# Patient Record
Sex: FEMALE
[De-identification: names, ages, dates, MRNs, and addresses within clinical notes are randomized per-mention and may not be internally consistent; named-entity substitution may affect disease eponyms.]

---

## 2017-10-22 ENCOUNTER — HEALTH MAINTENANCE LETTER (OUTPATIENT)
Age: 32
End: 2017-10-22

## 2018-02-01 ENCOUNTER — HOSPITAL ENCOUNTER (EMERGENCY)
Facility: HOSPITAL | Age: 33
Discharge: HOME | End: 2018-02-01
Attending: STUDENT IN AN ORGANIZED HEALTH CARE EDUCATION/TRAINING PROGRAM | Admitting: STUDENT IN AN ORGANIZED HEALTH CARE EDUCATION/TRAINING PROGRAM

## 2018-02-01 VITALS
WEIGHT: 145 LBS | BODY MASS INDEX: 24.16 KG/M2 | HEIGHT: 65 IN | DIASTOLIC BLOOD PRESSURE: 89 MMHG | SYSTOLIC BLOOD PRESSURE: 128 MMHG | HEART RATE: 104 BPM | OXYGEN SATURATION: 100 % | TEMPERATURE: 98.2 F | RESPIRATION RATE: 18 BRPM

## 2018-02-01 DIAGNOSIS — J20.9 ACUTE BRONCHITIS, UNSPECIFIED ORGANISM: Primary | ICD-10-CM

## 2018-02-01 DIAGNOSIS — R05.9 COUGH: ICD-10-CM

## 2018-02-01 PROCEDURE — 99282 EMERGENCY DEPT VISIT SF MDM: CPT

## 2018-02-01 RX ORDER — BENZONATATE 200 MG/1
200 CAPSULE ORAL 3 TIMES DAILY PRN
Qty: 20 CAPSULE | Refills: 0 | Status: SHIPPED | OUTPATIENT
Start: 2018-02-01

## 2018-02-01 RX ORDER — SULFAMETHOXAZOLE AND TRIMETHOPRIM 800; 160 MG/1; MG/1
1 TABLET ORAL 2 TIMES DAILY
Qty: 20 TABLET | Refills: 0 | Status: SHIPPED | OUTPATIENT
Start: 2018-02-01

## 2018-02-01 NOTE — DISCHARGE INSTRUCTIONS

## 2018-02-01 NOTE — ED PROVIDER NOTES
Subjective   HPI Comments: Patient's 32-year-old female presents with 2 day history of progressively worsening cough.  Patient denies fever or sweats, but does endorse chills.  She is tobacco cigarette smoker.  She does have a cough and feels like her chest hurts whenever she coughs or takes a deep breath at this time.      Review of Systems    Past Medical History:   Diagnosis Date   • Seizures        No Known Allergies    Past Surgical History:   Procedure Laterality Date   • APPENDECTOMY         History reviewed. No pertinent family history.    Social History     Social History   • Marital status: Single     Spouse name: N/A   • Number of children: N/A   • Years of education: N/A     Social History Main Topics   • Smoking status: Current Every Day Smoker     Packs/day: 0.25     Types: Cigarettes   • Smokeless tobacco: None   • Alcohol use Yes      Comment: wine on weekends   • Drug use: No   • Sexual activity: Defer     Other Topics Concern   • None     Social History Narrative   • None           Objective   Physical Exam   Nursing note and vitals reviewed.  GEN: No acute distress  Head: Normocephalic, atraumatic  Eyes: Pupils equal round reactive to light  ENT: Posterior pharynx displays mild cobblestoning otherwise normal in appearance, oral mucosa is moist  Chest: Nontender to palpation  Cardiovascular: Regular rate  Lungs: Clear to auscultation bilaterally, bronchospastic cough with deep breaths  Abdomen: Soft, nontender, nondistended, no peritoneal signs  Extremities: No edema, normal appearance  Neuro: GCS 15  Psych: Mood and affect are appropriate    Procedures         ED Course  ED Course                  MDM  Number of Diagnoses or Management Options  Acute bronchitis, unspecified organism:   Cough:   Diagnosis management comments: Tessalon and bactrim for bronchitis  >3 min spent on smoking cessation counseling      Final diagnoses:   Acute bronchitis, unspecified organism   Cough            Edouard ROSADO  MD Padilla  02/01/18 0745

## 2018-03-14 ENCOUNTER — TELEPHONE (OUTPATIENT)
Dept: RHEUMATOLOGY | Facility: CLINIC | Age: 33
End: 2018-03-14

## 2018-03-14 NOTE — TELEPHONE ENCOUNTER
New patient referral for polyarthritis from Dr. Jenn Otero at [facility]. Called patient's Home number on file [phone] (home). Left a message [phone] asking patient to call back regarding the referral we received. An intake form needs to be completed over the phone and records are needed from outside facilities. Awaiting a call back from the patient.  Angelica Arellano CMA  3/14/2018 1:18 PM

## 2018-03-27 NOTE — TELEPHONE ENCOUNTER
Second attempt to contact patient regarding the referral we received. Message left asking patient to return my call to complete an intake form. Awaiting call back from patient.     Angelica Arellano CMA  3/27/2018 2:55 PM